# Patient Record
Sex: FEMALE | Race: WHITE | NOT HISPANIC OR LATINO
[De-identification: names, ages, dates, MRNs, and addresses within clinical notes are randomized per-mention and may not be internally consistent; named-entity substitution may affect disease eponyms.]

---

## 2019-02-05 ENCOUNTER — APPOINTMENT (OUTPATIENT)
Dept: OBGYN | Facility: CLINIC | Age: 59
End: 2019-02-05
Payer: COMMERCIAL

## 2019-02-05 VITALS
DIASTOLIC BLOOD PRESSURE: 80 MMHG | SYSTOLIC BLOOD PRESSURE: 120 MMHG | HEIGHT: 61 IN | BODY MASS INDEX: 24.92 KG/M2 | WEIGHT: 132 LBS

## 2019-02-05 DIAGNOSIS — N95.1 MENOPAUSAL AND FEMALE CLIMACTERIC STATES: ICD-10-CM

## 2019-02-05 DIAGNOSIS — Z82.49 FAMILY HISTORY OF ISCHEMIC HEART DISEASE AND OTHER DISEASES OF THE CIRCULATORY SYSTEM: ICD-10-CM

## 2019-02-05 DIAGNOSIS — Z82.3 FAMILY HISTORY OF STROKE: ICD-10-CM

## 2019-02-05 DIAGNOSIS — R39.15 URGENCY OF URINATION: ICD-10-CM

## 2019-02-05 DIAGNOSIS — Z00.00 ENCOUNTER FOR GENERAL ADULT MEDICAL EXAMINATION W/OUT ABNORMAL FINDINGS: ICD-10-CM

## 2019-02-05 DIAGNOSIS — Z80.3 FAMILY HISTORY OF MALIGNANT NEOPLASM OF BREAST: ICD-10-CM

## 2019-02-05 DIAGNOSIS — Z87.39 PERSONAL HISTORY OF OTHER DISEASES OF THE MUSCULOSKELETAL SYSTEM AND CONNECTIVE TISSUE: ICD-10-CM

## 2019-02-05 DIAGNOSIS — N63.0 UNSPECIFIED LUMP IN UNSPECIFIED BREAST: ICD-10-CM

## 2019-02-05 PROCEDURE — 99386 PREV VISIT NEW AGE 40-64: CPT

## 2019-02-05 PROCEDURE — 99203 OFFICE O/P NEW LOW 30 MIN: CPT | Mod: 25

## 2019-02-05 NOTE — PHYSICAL EXAM
[Awake] : awake [Alert] : alert [Acute Distress] : no acute distress [Mass] : breast mass [Nipple Discharge] : no nipple discharge [Axillary LAD] : no axillary lymphadenopathy [Soft] : soft [Tender] : non tender [Oriented x3] : oriented to person, place, and time [Normal] : uterus [No Bleeding] : there was no active vaginal bleeding [Uterine Adnexae] : were not tender and not enlarged [RRR, No Murmurs] : RRR, no murmurs [CTAB] : CTAB [de-identified] : tender 2 cm lump at 7 on right breast.

## 2019-02-11 LAB
CYTOLOGY CVX/VAG DOC THIN PREP: NORMAL
HPV HIGH+LOW RISK DNA PNL CVX: NOT DETECTED

## 2021-02-16 ENCOUNTER — APPOINTMENT (OUTPATIENT)
Dept: INTERNAL MEDICINE | Facility: CLINIC | Age: 61
End: 2021-02-16
Payer: COMMERCIAL

## 2021-02-16 DIAGNOSIS — U07.1 COVID-19: ICD-10-CM

## 2021-02-16 DIAGNOSIS — Z78.9 OTHER SPECIFIED HEALTH STATUS: ICD-10-CM

## 2021-02-16 PROCEDURE — 99203 OFFICE O/P NEW LOW 30 MIN: CPT | Mod: 95

## 2021-02-16 RX ORDER — MULTIVITAMIN
TABLET ORAL
Refills: 0 | Status: ACTIVE | COMMUNITY

## 2021-02-16 RX ORDER — PNV NO.95/FERROUS FUM/FOLIC AC 28MG-0.8MG
TABLET ORAL
Refills: 0 | Status: ACTIVE | COMMUNITY

## 2021-02-16 RX ORDER — ASCORBIC ACID 500 MG
TABLET ORAL
Refills: 0 | Status: ACTIVE | COMMUNITY

## 2021-02-16 NOTE — PLAN
[FreeTextEntry1] : She has recovered from COVID 19 but still has mild headache and fatigue. She is out of isolation.\par Attempted to reassure patient that he symptoms will resolve with time\par When she feels better she should have the 2nd dose of the shingles vaccine. It should be  from the COVID vaccine by 2 weeks but as she is not currently eligible there should not be an issue with timing\par She will schedule a physical

## 2021-02-16 NOTE — HISTORY OF PRESENT ILLNESS
[FreeTextEntry8] : 61 y/o female with no significant PMH who presents after COVID infection. She states she was diagnosed with COVID on 1/23/21.  She started to feel sick a few days before.  She had sneezing, coughing.  On the 23rd she developed loss of smell and taste.  She had fevers up to 100.  She did have congestion and some tightness in her chest.  She had fatigue.  She isolated for 2 weeks.  She went back to work last week but has been working only half days because of tiredness.  She works as an  at an [employer] company.  Her smell and taste are back although they appear off.  She still is having mild headaches, sinus problems and tiredness.  No fevers.\par She had shingles vaccine in November.  She was supposed to go back for the second dose in January but she did not because of of the Covid infection.  She wonders if she can have the second dose and when she should have it in relation to the Covid vaccine.  She is not currently eligible for the vaccine.

## 2021-06-01 ENCOUNTER — OUTPATIENT (OUTPATIENT)
Dept: OUTPATIENT SERVICES | Facility: HOSPITAL | Age: 61
LOS: 1 days | End: 2021-06-01
Payer: COMMERCIAL

## 2021-06-01 ENCOUNTER — RESULT REVIEW (OUTPATIENT)
Age: 61
End: 2021-06-01

## 2021-06-01 DIAGNOSIS — C50.919 MALIGNANT NEOPLASM OF UNSPECIFIED SITE OF UNSPECIFIED FEMALE BREAST: ICD-10-CM

## 2021-06-01 PROCEDURE — 88321 CONSLTJ&REPRT SLD PREP ELSWR: CPT

## 2021-06-02 DIAGNOSIS — C50.919 MALIGNANT NEOPLASM OF UNSPECIFIED SITE OF UNSPECIFIED FEMALE BREAST: ICD-10-CM

## 2021-06-11 ENCOUNTER — OUTPATIENT (OUTPATIENT)
Dept: OUTPATIENT SERVICES | Facility: HOSPITAL | Age: 61
LOS: 1 days | End: 2021-06-11
Payer: COMMERCIAL

## 2021-06-11 VITALS — HEIGHT: 60 IN | WEIGHT: 132.06 LBS

## 2021-06-11 DIAGNOSIS — Z90.89 ACQUIRED ABSENCE OF OTHER ORGANS: Chronic | ICD-10-CM

## 2021-06-11 DIAGNOSIS — C50.919 MALIGNANT NEOPLASM OF UNSPECIFIED SITE OF UNSPECIFIED FEMALE BREAST: ICD-10-CM

## 2021-06-11 DIAGNOSIS — Z01.818 ENCOUNTER FOR OTHER PREPROCEDURAL EXAMINATION: ICD-10-CM

## 2021-06-11 DIAGNOSIS — Z98.890 OTHER SPECIFIED POSTPROCEDURAL STATES: Chronic | ICD-10-CM

## 2021-06-11 LAB
ANION GAP SERPL CALC-SCNC: 2 MMOL/L — LOW (ref 5–17)
BASOPHILS # BLD AUTO: 0.05 K/UL — SIGNIFICANT CHANGE UP (ref 0–0.2)
BASOPHILS NFR BLD AUTO: 0.8 % — SIGNIFICANT CHANGE UP (ref 0–2)
BUN SERPL-MCNC: 13 MG/DL — SIGNIFICANT CHANGE UP (ref 7–23)
CALCIUM SERPL-MCNC: 9.1 MG/DL — SIGNIFICANT CHANGE UP (ref 8.5–10.1)
CHLORIDE SERPL-SCNC: 106 MMOL/L — SIGNIFICANT CHANGE UP (ref 96–108)
CO2 SERPL-SCNC: 29 MMOL/L — SIGNIFICANT CHANGE UP (ref 22–31)
CREAT SERPL-MCNC: 0.93 MG/DL — SIGNIFICANT CHANGE UP (ref 0.5–1.3)
EOSINOPHIL # BLD AUTO: 0.15 K/UL — SIGNIFICANT CHANGE UP (ref 0–0.5)
EOSINOPHIL NFR BLD AUTO: 2.5 % — SIGNIFICANT CHANGE UP (ref 0–6)
GLUCOSE SERPL-MCNC: 106 MG/DL — HIGH (ref 70–99)
HCT VFR BLD CALC: 41.7 % — SIGNIFICANT CHANGE UP (ref 34.5–45)
HGB BLD-MCNC: 13.9 G/DL — SIGNIFICANT CHANGE UP (ref 11.5–15.5)
IMM GRANULOCYTES NFR BLD AUTO: 0.3 % — SIGNIFICANT CHANGE UP (ref 0–1.5)
LYMPHOCYTES # BLD AUTO: 1.25 K/UL — SIGNIFICANT CHANGE UP (ref 1–3.3)
LYMPHOCYTES # BLD AUTO: 20.6 % — SIGNIFICANT CHANGE UP (ref 13–44)
MCHC RBC-ENTMCNC: 29.6 PG — SIGNIFICANT CHANGE UP (ref 27–34)
MCHC RBC-ENTMCNC: 33.3 GM/DL — SIGNIFICANT CHANGE UP (ref 32–36)
MCV RBC AUTO: 88.9 FL — SIGNIFICANT CHANGE UP (ref 80–100)
MONOCYTES # BLD AUTO: 0.66 K/UL — SIGNIFICANT CHANGE UP (ref 0–0.9)
MONOCYTES NFR BLD AUTO: 10.9 % — SIGNIFICANT CHANGE UP (ref 2–14)
NEUTROPHILS # BLD AUTO: 3.95 K/UL — SIGNIFICANT CHANGE UP (ref 1.8–7.4)
NEUTROPHILS NFR BLD AUTO: 64.9 % — SIGNIFICANT CHANGE UP (ref 43–77)
PLATELET # BLD AUTO: 263 K/UL — SIGNIFICANT CHANGE UP (ref 150–400)
POTASSIUM SERPL-MCNC: 5.2 MMOL/L — SIGNIFICANT CHANGE UP (ref 3.5–5.3)
POTASSIUM SERPL-SCNC: 5.2 MMOL/L — SIGNIFICANT CHANGE UP (ref 3.5–5.3)
RBC # BLD: 4.69 M/UL — SIGNIFICANT CHANGE UP (ref 3.8–5.2)
RBC # FLD: 13.2 % — SIGNIFICANT CHANGE UP (ref 10.3–14.5)
SODIUM SERPL-SCNC: 137 MMOL/L — SIGNIFICANT CHANGE UP (ref 135–145)
WBC # BLD: 6.08 K/UL — SIGNIFICANT CHANGE UP (ref 3.8–10.5)
WBC # FLD AUTO: 6.08 K/UL — SIGNIFICANT CHANGE UP (ref 3.8–10.5)

## 2021-06-11 PROCEDURE — 80048 BASIC METABOLIC PNL TOTAL CA: CPT

## 2021-06-11 PROCEDURE — 36415 COLL VENOUS BLD VENIPUNCTURE: CPT

## 2021-06-11 PROCEDURE — 93010 ELECTROCARDIOGRAM REPORT: CPT

## 2021-06-11 PROCEDURE — 93005 ELECTROCARDIOGRAM TRACING: CPT

## 2021-06-11 PROCEDURE — 85025 COMPLETE CBC W/AUTO DIFF WBC: CPT

## 2021-06-11 NOTE — H&P PST ADULT - NSANTHOSAYNRD_GEN_A_CORE
never tested/No. MADIE screening performed.  STOP BANG Legend: 0-2 = LOW Risk; 3-4 = INTERMEDIATE Risk; 5-8 = HIGH Risk

## 2021-06-11 NOTE — H&P PST ADULT - NSICDXPASTMEDICALHX_GEN_ALL_CORE_FT
PAST MEDICAL HISTORY:  Breast cancer right breast    H/O bronchiectasis albuterol prn    H/O inguinal hernia left    H/O osteoporosis

## 2021-06-11 NOTE — H&P PST ADULT - HISTORY OF PRESENT ILLNESS
60 yrs old female with h/o COVID in Jan 2021, and went to pcp after, who felt a mass in her right breast. Pt had mammo , sono and biopsy fo the right breast that showed malignancy. Pt present for Mary Jo  and right breast lumpectomy and sentinel lymph node biopsy.

## 2021-06-11 NOTE — H&P PST ADULT - NEGATIVE ENMT SYMPTOMS
no hearing difficulty/no ear pain/no tinnitus/no nasal congestion/no nasal discharge/no nasal obstruction/no nose bleeds

## 2021-06-11 NOTE — H&P PST ADULT - ASSESSMENT
60 yrs old female with h/o COVID in Jan 2021, and went to pcp after, who felt a mass in her right breast. Pt had mammo , sono and biopsy fo the right breast that showed malignancy. Pt present for Mary Jo  and right breast lumpectomy and sentinel lymph node biopsy.    Plan:  - PST instructions given ; NPO status instructions to be given by ASU .  - Pt instructed to take following meds with sip of water :   - Pt instructed to take routine evening medications unless indicated .  -  Stop NSAIDS ( Aspirin Alev Motrin Mobic Diclofenac), herbal supplements , MVI , Vitamin fish oil 7 days prior to surgery  unless   directed by surgeon or cardiologist;   - Medical Optimization  with Dr. Engel  - EZ wash instructions given & mupirocin instructions given.  - Labs EKG  as per surgeon request.   -  Pt instructed to self quarantine .  - Covid Testing scheduled on 6/18/21.  Pt notified and aware.  - Pt denies covid symptoms shortness of breath fever cough .

## 2021-06-12 DIAGNOSIS — C50.919 MALIGNANT NEOPLASM OF UNSPECIFIED SITE OF UNSPECIFIED FEMALE BREAST: ICD-10-CM

## 2021-06-12 DIAGNOSIS — Z01.818 ENCOUNTER FOR OTHER PREPROCEDURAL EXAMINATION: ICD-10-CM

## 2021-06-18 ENCOUNTER — APPOINTMENT (OUTPATIENT)
Dept: DISASTER EMERGENCY | Facility: CLINIC | Age: 61
End: 2021-06-18

## 2021-06-18 RX ORDER — HYDROMORPHONE HYDROCHLORIDE 2 MG/ML
0.5 INJECTION INTRAMUSCULAR; INTRAVENOUS; SUBCUTANEOUS
Refills: 0 | Status: DISCONTINUED | OUTPATIENT
Start: 2021-06-21 | End: 2021-06-21

## 2021-06-18 RX ORDER — SODIUM CHLORIDE 9 MG/ML
1000 INJECTION INTRAMUSCULAR; INTRAVENOUS; SUBCUTANEOUS
Refills: 0 | Status: DISCONTINUED | OUTPATIENT
Start: 2021-06-21 | End: 2021-06-21

## 2021-06-18 RX ORDER — MEPERIDINE HYDROCHLORIDE 50 MG/ML
12.5 INJECTION INTRAMUSCULAR; INTRAVENOUS; SUBCUTANEOUS
Refills: 0 | Status: DISCONTINUED | OUTPATIENT
Start: 2021-06-21 | End: 2021-06-21

## 2021-06-18 RX ORDER — SODIUM CHLORIDE 9 MG/ML
3 INJECTION INTRAMUSCULAR; INTRAVENOUS; SUBCUTANEOUS EVERY 8 HOURS
Refills: 0 | Status: DISCONTINUED | OUTPATIENT
Start: 2021-06-21 | End: 2021-06-21

## 2021-06-18 RX ORDER — OXYCODONE HYDROCHLORIDE 5 MG/1
5 TABLET ORAL ONCE
Refills: 0 | Status: DISCONTINUED | OUTPATIENT
Start: 2021-06-21 | End: 2021-06-21

## 2021-06-18 RX ORDER — ONDANSETRON 8 MG/1
4 TABLET, FILM COATED ORAL ONCE
Refills: 0 | Status: DISCONTINUED | OUTPATIENT
Start: 2021-06-21 | End: 2021-06-21

## 2021-06-19 LAB — SARS-COV-2 N GENE NPH QL NAA+PROBE: NOT DETECTED

## 2021-06-21 ENCOUNTER — RESULT REVIEW (OUTPATIENT)
Age: 61
End: 2021-06-21

## 2021-06-21 ENCOUNTER — OUTPATIENT (OUTPATIENT)
Dept: INPATIENT UNIT | Facility: HOSPITAL | Age: 61
LOS: 1 days | Discharge: HOME | End: 2021-06-21
Payer: COMMERCIAL

## 2021-06-21 ENCOUNTER — NON-APPOINTMENT (OUTPATIENT)
Age: 61
End: 2021-06-21

## 2021-06-21 VITALS
HEART RATE: 80 BPM | TEMPERATURE: 98 F | SYSTOLIC BLOOD PRESSURE: 112 MMHG | RESPIRATION RATE: 14 BRPM | OXYGEN SATURATION: 99 % | DIASTOLIC BLOOD PRESSURE: 72 MMHG

## 2021-06-21 VITALS
RESPIRATION RATE: 14 BRPM | SYSTOLIC BLOOD PRESSURE: 134 MMHG | WEIGHT: 134.48 LBS | TEMPERATURE: 98 F | DIASTOLIC BLOOD PRESSURE: 82 MMHG | OXYGEN SATURATION: 100 % | HEIGHT: 61 IN | HEART RATE: 91 BPM

## 2021-06-21 DIAGNOSIS — Z98.890 OTHER SPECIFIED POSTPROCEDURAL STATES: Chronic | ICD-10-CM

## 2021-06-21 DIAGNOSIS — C50.919 MALIGNANT NEOPLASM OF UNSPECIFIED SITE OF UNSPECIFIED FEMALE BREAST: ICD-10-CM

## 2021-06-21 DIAGNOSIS — N64.89 OTHER SPECIFIED DISORDERS OF BREAST: ICD-10-CM

## 2021-06-21 DIAGNOSIS — Z90.89 ACQUIRED ABSENCE OF OTHER ORGANS: Chronic | ICD-10-CM

## 2021-06-21 DIAGNOSIS — N65.1 DISPROPORTION OF RECONSTRUCTED BREAST: ICD-10-CM

## 2021-06-21 PROCEDURE — 99261: CPT

## 2021-06-21 PROCEDURE — 88341 IMHCHEM/IMCYTCHM EA ADD ANTB: CPT | Mod: 26,59

## 2021-06-21 PROCEDURE — 19307 MAST MOD RAD: CPT | Mod: AS

## 2021-06-21 PROCEDURE — 88360 TUMOR IMMUNOHISTOCHEM/MANUAL: CPT | Mod: 26,59

## 2021-06-21 PROCEDURE — 88305 TISSUE EXAM BY PATHOLOGIST: CPT | Mod: 26

## 2021-06-21 PROCEDURE — 88342 IMHCHEM/IMCYTCHM 1ST ANTB: CPT | Mod: 26,59

## 2021-06-21 PROCEDURE — 88307 TISSUE EXAM BY PATHOLOGIST: CPT | Mod: 26

## 2021-06-21 PROCEDURE — 88360 TUMOR IMMUNOHISTOCHEM/MANUAL: CPT

## 2021-06-21 PROCEDURE — 88304 TISSUE EXAM BY PATHOLOGIST: CPT

## 2021-06-21 PROCEDURE — C1789: CPT

## 2021-06-21 PROCEDURE — 88341 IMHCHEM/IMCYTCHM EA ADD ANTB: CPT

## 2021-06-21 PROCEDURE — 88305 TISSUE EXAM BY PATHOLOGIST: CPT

## 2021-06-21 PROCEDURE — 88304 TISSUE EXAM BY PATHOLOGIST: CPT | Mod: 26

## 2021-06-21 PROCEDURE — 88307 TISSUE EXAM BY PATHOLOGIST: CPT

## 2021-06-21 PROCEDURE — 88342 IMHCHEM/IMCYTCHM 1ST ANTB: CPT

## 2021-06-21 RX ORDER — BUPIVACAINE 13.3 MG/ML
20 INJECTION, SUSPENSION, LIPOSOMAL INFILTRATION ONCE
Refills: 0 | Status: DISCONTINUED | OUTPATIENT
Start: 2021-06-21 | End: 2021-06-21

## 2021-06-21 RX ORDER — ACETAMINOPHEN 500 MG
975 TABLET ORAL ONCE
Refills: 0 | Status: COMPLETED | OUTPATIENT
Start: 2021-06-21 | End: 2021-06-21

## 2021-06-21 RX ORDER — MULTIVIT-MIN/FERROUS GLUCONATE 9 MG/15 ML
0 LIQUID (ML) ORAL
Qty: 0 | Refills: 0 | DISCHARGE

## 2021-06-21 RX ORDER — CELECOXIB 200 MG/1
200 CAPSULE ORAL ONCE
Refills: 0 | Status: COMPLETED | OUTPATIENT
Start: 2021-06-21 | End: 2021-06-21

## 2021-06-21 RX ORDER — FAMOTIDINE 10 MG/ML
20 INJECTION INTRAVENOUS ONCE
Refills: 0 | Status: COMPLETED | OUTPATIENT
Start: 2021-06-21 | End: 2021-06-21

## 2021-06-21 RX ORDER — SODIUM CHLORIDE 9 MG/ML
1000 INJECTION, SOLUTION INTRAVENOUS
Refills: 0 | Status: DISCONTINUED | OUTPATIENT
Start: 2021-06-21 | End: 2021-06-21

## 2021-06-21 RX ORDER — LETROZOLE 2.5 MG/1
1 TABLET, FILM COATED ORAL
Qty: 0 | Refills: 0 | DISCHARGE

## 2021-06-21 RX ORDER — OXYCODONE HYDROCHLORIDE 5 MG/1
10 TABLET ORAL ONCE
Refills: 0 | Status: DISCONTINUED | OUTPATIENT
Start: 2021-06-21 | End: 2021-06-21

## 2021-06-21 RX ORDER — CHOLECALCIFEROL (VITAMIN D3) 125 MCG
0 CAPSULE ORAL
Qty: 0 | Refills: 0 | DISCHARGE

## 2021-06-21 RX ORDER — PREGABALIN 225 MG/1
1 CAPSULE ORAL
Qty: 0 | Refills: 0 | DISCHARGE

## 2021-06-21 RX ORDER — ALBUTEROL 90 UG/1
0 AEROSOL, METERED ORAL
Qty: 0 | Refills: 0 | DISCHARGE

## 2021-06-21 RX ADMIN — Medication 975 MILLIGRAM(S): at 07:57

## 2021-06-21 RX ADMIN — OXYCODONE HYDROCHLORIDE 10 MILLIGRAM(S): 5 TABLET ORAL at 07:58

## 2021-06-21 RX ADMIN — HYDROMORPHONE HYDROCHLORIDE 0.5 MILLIGRAM(S): 2 INJECTION INTRAMUSCULAR; INTRAVENOUS; SUBCUTANEOUS at 13:25

## 2021-06-21 RX ADMIN — OXYCODONE HYDROCHLORIDE 10 MILLIGRAM(S): 5 TABLET ORAL at 07:57

## 2021-06-21 RX ADMIN — CELECOXIB 200 MILLIGRAM(S): 200 CAPSULE ORAL at 07:57

## 2021-06-21 RX ADMIN — OXYCODONE HYDROCHLORIDE 5 MILLIGRAM(S): 5 TABLET ORAL at 15:01

## 2021-06-21 RX ADMIN — CELECOXIB 200 MILLIGRAM(S): 200 CAPSULE ORAL at 07:58

## 2021-06-21 RX ADMIN — FAMOTIDINE 20 MILLIGRAM(S): 10 INJECTION INTRAVENOUS at 07:57

## 2021-06-21 RX ADMIN — HYDROMORPHONE HYDROCHLORIDE 0.5 MILLIGRAM(S): 2 INJECTION INTRAMUSCULAR; INTRAVENOUS; SUBCUTANEOUS at 13:44

## 2021-06-21 NOTE — ASU DISCHARGE PLAN (ADULT/PEDIATRIC) - ASU DC SPECIAL INSTRUCTIONSFT
follow up with Dr. Dang as scheduled  take antibiotics and pain meds as instructed  no shower; sponge bath only  empty and record Domingo outputs q 12hr  leave all dressings in place   may

## 2021-06-21 NOTE — ASU PATIENT PROFILE, ADULT - PMH
Breast cancer  right breast  H/O bronchiectasis  albuterol prn  H/O inguinal hernia  left  H/O osteoporosis

## 2021-06-21 NOTE — ASU PREOP CHECKLIST - STERILIZATION AFFIRMATION
-- DO NOT REPLY / DO NOT REPLY ALL --  -- Message is from the [facility]--    COVID-19 Universal Screening: Negative    General Patient Message      Reason for Call: patient daughter would like a call back from the nurse due to a fall, and patient is at [facility] and is dizzy, and she has macroadenoma    Caller Information       Type Contact Phone    01/06/2021 11:19 AM CST Phone (Incoming) Wendy Yuan (Emergency Contact) [phone]     daughter          Alternative phone number: [phone]  Turnaround time given to caller:   \"This message will be sent to [state Provider's name]. The clinical team will fulfill your request as soon as they review your message.\"     n/a

## 2021-06-28 DIAGNOSIS — C50.911 MALIGNANT NEOPLASM OF UNSPECIFIED SITE OF RIGHT FEMALE BREAST: ICD-10-CM

## 2021-06-28 DIAGNOSIS — M81.0 AGE-RELATED OSTEOPOROSIS WITHOUT CURRENT PATHOLOGICAL FRACTURE: ICD-10-CM

## 2021-07-09 PROBLEM — Z87.39 PERSONAL HISTORY OF OTHER DISEASES OF THE MUSCULOSKELETAL SYSTEM AND CONNECTIVE TISSUE: Chronic | Status: ACTIVE | Noted: 2021-06-11

## 2021-07-09 PROBLEM — Z87.09 PERSONAL HISTORY OF OTHER DISEASES OF THE RESPIRATORY SYSTEM: Chronic | Status: ACTIVE | Noted: 2021-06-11

## 2021-07-09 PROBLEM — Z87.19 PERSONAL HISTORY OF OTHER DISEASES OF THE DIGESTIVE SYSTEM: Chronic | Status: ACTIVE | Noted: 2021-06-11

## 2021-07-09 PROBLEM — C50.919 MALIGNANT NEOPLASM OF UNSPECIFIED SITE OF UNSPECIFIED FEMALE BREAST: Chronic | Status: ACTIVE | Noted: 2021-06-11

## 2021-07-29 ENCOUNTER — APPOINTMENT (OUTPATIENT)
Dept: RADIATION ONCOLOGY | Facility: CLINIC | Age: 61
End: 2021-07-29
Payer: COMMERCIAL

## 2021-07-29 VITALS
HEIGHT: 60 IN | DIASTOLIC BLOOD PRESSURE: 70 MMHG | BODY MASS INDEX: 26.5 KG/M2 | WEIGHT: 135 LBS | RESPIRATION RATE: 16 BRPM | HEART RATE: 62 BPM | SYSTOLIC BLOOD PRESSURE: 122 MMHG

## 2021-07-29 DIAGNOSIS — M19.91 PRIMARY OSTEOARTHRITIS, UNSPECIFIED SITE: ICD-10-CM

## 2021-07-29 DIAGNOSIS — Z80.42 FAMILY HISTORY OF MALIGNANT NEOPLASM OF PROSTATE: ICD-10-CM

## 2021-07-29 DIAGNOSIS — Z23 ENCOUNTER FOR IMMUNIZATION: ICD-10-CM

## 2021-07-29 DIAGNOSIS — Z80.0 FAMILY HISTORY OF MALIGNANT NEOPLASM OF DIGESTIVE ORGANS: ICD-10-CM

## 2021-07-29 PROCEDURE — 99204 OFFICE O/P NEW MOD 45 MIN: CPT | Mod: 25

## 2021-07-29 PROCEDURE — 99072 ADDL SUPL MATRL&STAF TM PHE: CPT

## 2021-07-29 RX ORDER — CHLORHEXIDINE GLUCONATE 4 %
5 LIQUID (ML) TOPICAL
Refills: 0 | Status: ACTIVE | COMMUNITY

## 2021-07-29 RX ORDER — ZINC SULFATE 50(220)MG
CAPSULE ORAL
Refills: 0 | Status: DISCONTINUED | COMMUNITY
End: 2021-07-29

## 2021-07-29 RX ORDER — LETROZOLE TABLETS 2.5 MG/1
2.5 TABLET, FILM COATED ORAL
Refills: 0 | Status: ACTIVE | COMMUNITY

## 2021-07-29 RX ORDER — DENOSUMAB 60 MG/ML
60 INJECTION SUBCUTANEOUS
Refills: 0 | Status: ACTIVE | COMMUNITY

## 2021-07-29 NOTE — PHYSICAL EXAM
[Breast Palpation Mass] : no palpable masses [No UE Edema] : there is no upper extremity edema [Normal] : oriented to person, place and time, the affect was normal, the mood was normal and not anxious [de-identified] : right expander intact with cording righ axilla and upper arm. Left breast scars healing well.

## 2021-07-29 NOTE — HISTORY OF PRESENT ILLNESS
[FreeTextEntry1] : The patient is a pleasant 60 year old female diagnosed with right breast cancer. She has family Hx of metastatic breast cancer in her 43 year old sister ( at age 29), tested negative for BRCA mutation 15 years ago. The patient had [facility] genetic panel testing which was negative (VUS in APC, BMPR1A, RAD51D). She was recovering from a mild case of COVID in 2021 and also noted right breast fullness (present about 1 year ago but did not bring it to any medical attention). She had not had any recent imaging. She was seen by her primary care doctor in 2021 and breast imaging was recommended due to an abnormal right breast and axilla exam. She was referred to Dr. Avila and had a confirmation of palpable right breast mass and axillary lymphadenopathy. Mammogram/ tomosynthesis revealed 2.1 cm enlarged right axillary node with cortical thickening and a 2.3 cm spiculated mass in the right breast at 6:00, 5cm FN. An MRI was recommended but not performed. Biopsy was performed on 3/24//21 of both breast and right axillary node and both were positive for Infiltrating ductal carcinoma, moderately differentiated, DCIS, solid and cribriform types, intermediate to high grade. ER %, CA negative, HER2 negative. Ki-67 35%. As part of preop workup CT chest, abdomen and pelvis was done revealing nonspecific subcentimeter pulmonary nodules and urinary bladder wall enhancing lesion.She was referred to Pulmonary/Urology (Dr. Hale) for clearance and there was no pathology confirmed She was also seen by gyn (Dr. Naik) who diagnosed her with fibroids rather than bladder wall thickening. She was started on Letrozole in anticipation of delay. After 2 months of Letrozole she noted a decrease in size of the lesion. Right mastectomy was performed with expander (Dr. Dang) on 21 and final pathology revealed  Invasive Ductal Carcinoma, tumor greatest dimension 3.3 x 2.1 x 1.5 cm. DCIS present. Margins uninvolved by IDC with closest distance 0.6 cm anterior. DCIS not found in margins, greater then 1cm. + LVI.  2/7 regional lymph nodes positive, both macrometastases, >2mm. The largest metastatic deposit 1.4 cm. Extranodal extension present. Biomarkers ER positive, CA negative, HER2 negative by FISH. Ki-67 35%. Oncotype DX is 27 and she is meeting with Dr. Crooks next week to review. She presents today to discuss the role of radiation therapy in her care.

## 2021-07-29 NOTE — REVIEW OF SYSTEMS
[Fatigue] : fatigue [Joint Pain] : joint pain [Muscle Pain] : muscle pain [Insomnia] : insomnia [Anxiety] : anxiety [Negative] : Allergic/Immunologic

## 2021-07-29 NOTE — VITALS
[Maximal Pain Intensity: 0/10] : 0/10 [NoTreatment Scheduled] : no treatment scheduled [90: Able to carry normal activity; minor signs or symptoms of disease.] : 90: Able to carry normal activity; minor signs or symptoms of disease.  [Date: ____________] : Patient's last distress assessment performed on [unfilled]. [5 - Distress Level] : Distress Level: 5

## 2021-07-30 ENCOUNTER — TRANSCRIPTION ENCOUNTER (OUTPATIENT)
Age: 61
End: 2021-07-30

## 2021-08-11 NOTE — ASU PATIENT PROFILE, ADULT - TOBACCO USE
[facility]  [address] [place] [address]  [phone]      Pregnancy Statement    1/9/2019      RE: Kristie Vasques, 1988  [address] [facility] [address]        To whom it may concern;    This is to certify that Kristie is pregnant and is being followed by our staff. Kristie's estimated due date is 6/29/2019.    Sincerely,    Alysia Salguero CNM/Peri Mustafa RN     Former smoker

## 2021-08-11 NOTE — ASU PATIENT PROFILE, ADULT - NSICDXPASTSURGICALHX_GEN_ALL_CORE_FT
PAST SURGICAL HISTORY:  H/O breast biopsy x2    H/O inguinal hernia repair left    History of tonsillectomy

## 2021-08-11 NOTE — ASU PATIENT PROFILE, ADULT - NSICDXPASTMEDICALHX_GEN_ALL_CORE_FT
PAST MEDICAL HISTORY:  Breast cancer right breast    H/O arthritis     H/O bronchiectasis albuterol prn    H/O inguinal hernia left    H/O osteoporosis     Hyperlipidemia

## 2021-08-12 ENCOUNTER — OUTPATIENT (OUTPATIENT)
Dept: INPATIENT UNIT | Facility: HOSPITAL | Age: 61
LOS: 1 days | Discharge: HOME | End: 2021-08-12
Payer: COMMERCIAL

## 2021-08-12 VITALS
SYSTOLIC BLOOD PRESSURE: 103 MMHG | RESPIRATION RATE: 14 BRPM | DIASTOLIC BLOOD PRESSURE: 63 MMHG | OXYGEN SATURATION: 96 % | TEMPERATURE: 98 F | HEART RATE: 99 BPM

## 2021-08-12 VITALS
RESPIRATION RATE: 16 BRPM | TEMPERATURE: 98 F | SYSTOLIC BLOOD PRESSURE: 123 MMHG | HEART RATE: 76 BPM | OXYGEN SATURATION: 98 % | WEIGHT: 134.92 LBS | DIASTOLIC BLOOD PRESSURE: 74 MMHG

## 2021-08-12 DIAGNOSIS — C50.911 MALIGNANT NEOPLASM OF UNSPECIFIED SITE OF RIGHT FEMALE BREAST: ICD-10-CM

## 2021-08-12 DIAGNOSIS — Z88.0 ALLERGY STATUS TO PENICILLIN: ICD-10-CM

## 2021-08-12 DIAGNOSIS — Z79.810 LONG TERM (CURRENT) USE OF SELECTIVE ESTROGEN RECEPTOR MODULATORS (SERMS): ICD-10-CM

## 2021-08-12 DIAGNOSIS — Z90.89 ACQUIRED ABSENCE OF OTHER ORGANS: Chronic | ICD-10-CM

## 2021-08-12 DIAGNOSIS — M19.90 UNSPECIFIED OSTEOARTHRITIS, UNSPECIFIED SITE: ICD-10-CM

## 2021-08-12 DIAGNOSIS — Z79.811 LONG TERM (CURRENT) USE OF AROMATASE INHIBITORS: ICD-10-CM

## 2021-08-12 DIAGNOSIS — Z98.890 OTHER SPECIFIED POSTPROCEDURAL STATES: Chronic | ICD-10-CM

## 2021-08-12 DIAGNOSIS — C50.511 MALIGNANT NEOPLASM OF LOWER-OUTER QUADRANT OF RIGHT FEMALE BREAST: ICD-10-CM

## 2021-08-12 DIAGNOSIS — Z87.891 PERSONAL HISTORY OF NICOTINE DEPENDENCE: ICD-10-CM

## 2021-08-12 DIAGNOSIS — E78.5 HYPERLIPIDEMIA, UNSPECIFIED: ICD-10-CM

## 2021-08-12 DIAGNOSIS — E03.9 HYPOTHYROIDISM, UNSPECIFIED: ICD-10-CM

## 2021-08-12 LAB
ANION GAP SERPL CALC-SCNC: 2 MMOL/L — LOW (ref 5–17)
BUN SERPL-MCNC: 12 MG/DL — SIGNIFICANT CHANGE UP (ref 7–23)
CALCIUM SERPL-MCNC: 8.7 MG/DL — SIGNIFICANT CHANGE UP (ref 8.5–10.1)
CHLORIDE SERPL-SCNC: 108 MMOL/L — SIGNIFICANT CHANGE UP (ref 96–108)
CO2 SERPL-SCNC: 28 MMOL/L — SIGNIFICANT CHANGE UP (ref 22–31)
CREAT SERPL-MCNC: 0.82 MG/DL — SIGNIFICANT CHANGE UP (ref 0.5–1.3)
GLUCOSE SERPL-MCNC: 94 MG/DL — SIGNIFICANT CHANGE UP (ref 70–99)
HCT VFR BLD CALC: 39.9 % — SIGNIFICANT CHANGE UP (ref 34.5–45)
HGB BLD-MCNC: 13.3 G/DL — SIGNIFICANT CHANGE UP (ref 11.5–15.5)
INR BLD: 1.06 RATIO — SIGNIFICANT CHANGE UP (ref 0.88–1.16)
MCHC RBC-ENTMCNC: 30.2 PG — SIGNIFICANT CHANGE UP (ref 27–34)
MCHC RBC-ENTMCNC: 33.3 GM/DL — SIGNIFICANT CHANGE UP (ref 32–36)
MCV RBC AUTO: 90.5 FL — SIGNIFICANT CHANGE UP (ref 80–100)
PLATELET # BLD AUTO: 249 K/UL — SIGNIFICANT CHANGE UP (ref 150–400)
POTASSIUM SERPL-MCNC: 4.5 MMOL/L — SIGNIFICANT CHANGE UP (ref 3.5–5.3)
POTASSIUM SERPL-SCNC: 4.5 MMOL/L — SIGNIFICANT CHANGE UP (ref 3.5–5.3)
PROTHROM AB SERPL-ACNC: 12.4 SEC — SIGNIFICANT CHANGE UP (ref 10.6–13.6)
RBC # BLD: 4.41 M/UL — SIGNIFICANT CHANGE UP (ref 3.8–5.2)
RBC # FLD: 13.3 % — SIGNIFICANT CHANGE UP (ref 10.3–14.5)
SODIUM SERPL-SCNC: 138 MMOL/L — SIGNIFICANT CHANGE UP (ref 135–145)
WBC # BLD: 5.39 K/UL — SIGNIFICANT CHANGE UP (ref 3.8–10.5)
WBC # FLD AUTO: 5.39 K/UL — SIGNIFICANT CHANGE UP (ref 3.8–10.5)

## 2021-08-12 PROCEDURE — 80048 BASIC METABOLIC PNL TOTAL CA: CPT

## 2021-08-12 PROCEDURE — 36561 INSERT TUNNELED CV CATH: CPT

## 2021-08-12 PROCEDURE — C1894: CPT

## 2021-08-12 PROCEDURE — 85610 PROTHROMBIN TIME: CPT

## 2021-08-12 PROCEDURE — 93005 ELECTROCARDIOGRAM TRACING: CPT

## 2021-08-12 PROCEDURE — 85027 COMPLETE CBC AUTOMATED: CPT

## 2021-08-12 PROCEDURE — C1769: CPT

## 2021-08-12 PROCEDURE — C1887: CPT

## 2021-08-12 PROCEDURE — 77001 FLUOROGUIDE FOR VEIN DEVICE: CPT | Mod: 26

## 2021-08-12 PROCEDURE — C1788: CPT

## 2021-08-12 PROCEDURE — 36415 COLL VENOUS BLD VENIPUNCTURE: CPT

## 2021-08-12 PROCEDURE — 76937 US GUIDE VASCULAR ACCESS: CPT | Mod: 26

## 2021-08-12 PROCEDURE — 76937 US GUIDE VASCULAR ACCESS: CPT

## 2021-08-12 PROCEDURE — 77001 FLUOROGUIDE FOR VEIN DEVICE: CPT

## 2021-08-12 PROCEDURE — 93010 ELECTROCARDIOGRAM REPORT: CPT

## 2021-08-12 RX ORDER — CALCIUM CARBONATE 500(1250)
2 TABLET ORAL
Qty: 0 | Refills: 0 | DISCHARGE

## 2021-08-12 RX ORDER — ALBUTEROL 90 UG/1
0 AEROSOL, METERED ORAL
Qty: 0 | Refills: 0 | DISCHARGE

## 2021-08-12 RX ORDER — LETROZOLE 2.5 MG/1
1 TABLET, FILM COATED ORAL
Qty: 0 | Refills: 0 | DISCHARGE

## 2021-08-12 RX ORDER — MEPERIDINE HYDROCHLORIDE 50 MG/ML
12.5 INJECTION INTRAMUSCULAR; INTRAVENOUS; SUBCUTANEOUS
Refills: 0 | Status: DISCONTINUED | OUTPATIENT
Start: 2021-08-12 | End: 2021-08-12

## 2021-08-12 RX ORDER — OXYCODONE AND ACETAMINOPHEN 5; 325 MG/1; MG/1
1 TABLET ORAL
Qty: 0 | Refills: 0 | DISCHARGE

## 2021-08-12 RX ORDER — SODIUM CHLORIDE 9 MG/ML
1000 INJECTION INTRAMUSCULAR; INTRAVENOUS; SUBCUTANEOUS
Refills: 0 | Status: DISCONTINUED | OUTPATIENT
Start: 2021-08-12 | End: 2021-08-12

## 2021-08-12 RX ORDER — HYDROMORPHONE HYDROCHLORIDE 2 MG/ML
0.5 INJECTION INTRAMUSCULAR; INTRAVENOUS; SUBCUTANEOUS
Refills: 0 | Status: DISCONTINUED | OUTPATIENT
Start: 2021-08-12 | End: 2021-08-12

## 2021-08-12 RX ORDER — DENOSUMAB 60 MG/ML
0 INJECTION SUBCUTANEOUS
Qty: 0 | Refills: 0 | DISCHARGE

## 2021-08-12 RX ORDER — OXYCODONE HYDROCHLORIDE 5 MG/1
5 TABLET ORAL ONCE
Refills: 0 | Status: DISCONTINUED | OUTPATIENT
Start: 2021-08-12 | End: 2021-08-12

## 2021-08-12 RX ORDER — ONDANSETRON 8 MG/1
4 TABLET, FILM COATED ORAL ONCE
Refills: 0 | Status: DISCONTINUED | OUTPATIENT
Start: 2021-08-12 | End: 2021-08-12

## 2021-08-12 NOTE — ASU DISCHARGE PLAN (ADULT/PEDIATRIC) - BRAND OF COVID-19 VACCINATION
CHIEF COMPLAINT  Fall and Dizziness      HISTORY OF PRESENT ILLNESS    PROBLEM/CONDITION: He describes chronic (well over a year) orthostatic hypotension symptoms to the degree that it is putting him at significant risk for falling during the night. EXACERBATING FACTOR may have included tamsulosin, which was prescribed a couple of years ago due to postoperative urinary retention. He reports no history of prostate problems. His wife called a friend who is a doctor who suggested that he stop the tamsulosin, and they stopped the medication a couple of days ago and he has had no urinary problems since and perhaps the orthostatic symptoms are MILDLY improved. This may explain why his blood pressure is up today. I told him to remain OFF the tamsulosin unless he started to develop urinary retention symptoms. However, he has fallen twice within the last several months and most recently, a few nights ago, fell forward hitting the front of his head, without loss of consciousness, but his wife describes that his cognition and short-term memory and balance are impaired. She reports no urinary incontinence. We discussed differential diagnosis.    PROBLEM/CONDITION: Review of previous labs shows chronic kidney disease, stage III, along with MILD anemia, and he is due for follow-up labs to evaluate these problems.    No other complaints or concerns reported.    Problem List Items Addressed This Visit        Neuro    Head injury    Relevant Orders    MRI Brain Without Contrast       Cardiac/Vascular    Benign essential hypertension    Orthostatic hypotension - Primary (Chronic)    Relevant Orders    Ambulatory Referral to Physical/Occupational Therapy       Renal/    Chronic kidney disease, stage 3 (Chronic)    Relevant Orders    Renal function panel    CBC auto differential       Oncology    Anemia due to stage 3 chronic kidney disease    Relevant Orders    CBC auto differential       Other    Impairment of balance    Relevant  "Orders    MRI Brain Without Contrast    Ambulatory Referral to Physical/Occupational Therapy    Impaired functional mobility, balance, gait, and endurance    Relevant Orders    Ambulatory Referral to Physical/Occupational Therapy    Personal history of fall (Chronic)    Relevant Orders    Ambulatory Referral to Physical/Occupational Therapy      Other Visit Diagnoses     Head trauma, initial encounter        Relevant Orders    MRI Brain Without Contrast          PAST MEDICAL HISTORY, FAMILY HISTORY and SOCIAL HISTORY, CURRENT MEDICATION LIST, and ALLERGY LIST reviewed by me (MARITZA Padilla MD) and are updated consistent with the patient's report.    REVIEW OF SYSTEMS  CONSTITUTIONAL: No fever reported.  CARDIOVASCULAR: No chest pain reported.  PULMONARY: No trouble breathing reported.     PHYSICAL EXAM  Vitals:    09/04/18 1317   BP: (!) 150/60   BP Location: Right arm   Patient Position: Sitting   BP Method: Large (Manual)   Pulse: 77   Temp: 98.6 °F (37 °C)   TempSrc: Oral   SpO2: 96%   Weight: 83.4 kg (183 lb 13.8 oz)   Height: 5' 11" (1.803 m)   CONSTITUTIONAL: Vital signs noted. No apparent distress. Does not appear acutely ill or septic. Appears adequately hydrated.  HEENT: External ENT grossly unremarkable. Hearing grossly poor. Oropharynx moist.  PULM: Lungs clear. Breathing unlabored.  HEART: Auscultation reveals regular rate and rhythm.  DERM: Skin warm and moist with fair turgor.  NEURO: There are no gross focal motor deficits or gross deficits of cranial nerves III-XII.  PSYCHIATRIC: Alert and oriented x 3. Mood is grossly neutral. Affect appropriate. Judgment and insight not grossly compromised.  MUSCULOSKELETAL: Mobile with rolling walker.    ASSESSMENT and PLAN  Orthostatic hypotension  -     Ambulatory Referral to Physical/Occupational Therapy    Benign essential hypertension    Chronic kidney disease, stage 3  -     Renal function panel; Future; Expected date: 09/04/2018  -     CBC auto " "differential; Future; Expected date: 09/04/2018    Anemia due to stage 3 chronic kidney disease  -     CBC auto differential; Future; Expected date: 09/04/2018    Impairment of balance  -     MRI Brain Without Contrast; Future; Expected date: 09/04/2018  -     Ambulatory Referral to Physical/Occupational Therapy    Impaired functional mobility, balance, gait, and endurance  -     Ambulatory Referral to Physical/Occupational Therapy    Personal history of fall  -     Ambulatory Referral to Physical/Occupational Therapy    Injury of head, initial encounter  -     MRI Brain Without Contrast; Future; Expected date: 09/04/2018    Head trauma, initial encounter  -     MRI Brain Without Contrast; Future; Expected date: 09/04/2018        PRESCRIPTION MEDICATION MANAGEMENT  Except as noted below, CURRENT MEDICATIONS are to remain unchanged from that listed above.     There are no discontinued medications.    Follow-up in about 2 days (around 9/6/2018) for review test results and discuss treatment plan.    There are no Patient Instructions on file for this visit.    ABOUT THIS DOCUMENTATION:  · The order of the conditions listed in the HPI is one of convenience and does not necessarily reflect the chronology of the appointment, nor the relative importance of a condition.  · Documentation entered by me for this encounter was done in part using speech-recognition technology. Although I have made an effort to ensure accuracy, "sound like" errors may exist and should be interpreted in context.                        -MARITZA Padilla MD  " Moderna dose 1 and 2

## 2021-08-12 NOTE — ASU DISCHARGE PLAN (ADULT/PEDIATRIC) - FREQUENT HAND WASHING PREVENTS THE SPREAD OF INFECTION.
Per Grandma, patient has been having intermittent chest pain x 2 weeks with occasional difficulty breathing. C/O headache and vomiting for the past 2 days. Patient reports hx of heart murmur. Mother passed away 2 months ago.
Statement Selected

## 2022-01-14 PROBLEM — E78.5 HYPERLIPIDEMIA, UNSPECIFIED: Chronic | Status: ACTIVE | Noted: 2021-08-11

## 2022-01-14 PROBLEM — Z87.39 PERSONAL HISTORY OF OTHER DISEASES OF THE MUSCULOSKELETAL SYSTEM AND CONNECTIVE TISSUE: Chronic | Status: ACTIVE | Noted: 2021-08-11

## 2022-01-18 ENCOUNTER — APPOINTMENT (OUTPATIENT)
Dept: RADIATION ONCOLOGY | Facility: CLINIC | Age: 62
End: 2022-01-18
Payer: COMMERCIAL

## 2022-01-18 VITALS — RESPIRATION RATE: 16 BRPM | BODY MASS INDEX: 25.13 KG/M2 | WEIGHT: 128 LBS | HEIGHT: 60 IN | HEART RATE: 70 BPM

## 2022-01-18 PROCEDURE — 77332 RADIATION TREATMENT AID(S): CPT

## 2022-01-18 PROCEDURE — 77263 THER RADIOLOGY TX PLNG CPLX: CPT

## 2022-01-18 PROCEDURE — 99213 OFFICE O/P EST LOW 20 MIN: CPT

## 2022-01-18 PROCEDURE — 77290 THER RAD SIMULAJ FIELD CPLX: CPT

## 2022-01-18 RX ORDER — ROSUVASTATIN CALCIUM 5 MG/1
TABLET, FILM COATED ORAL
Refills: 0 | Status: ACTIVE | COMMUNITY

## 2022-01-18 NOTE — PHYSICAL EXAM
[No UE Edema] : there is no upper extremity edema [Normal] : oriented to person, place and time, the affect was normal, the mood was normal and not anxious [de-identified] : left cw port [de-identified] : right expander intact and left breast IMF scar well healed. no masses

## 2022-01-18 NOTE — HISTORY OF PRESENT ILLNESS
[FreeTextEntry1] : The patient is a pleasant 60 year old female diagnosed with right breast cancer. She has family Hx of metastatic breast cancer in her 43 year old sister ( at age 29), tested negative for BRCA mutation 15 years ago. The patient had [facility] genetic panel testing which was negative (VUS in APC, BMPR1A, RAD51D). She was recovering from a mild case of COVID in 2021 and also noted right breast fullness (present about 1 year ago but did not bring it to any medical attention). She had not had any recent imaging. She was seen by her primary care doctor in 2021 and breast imaging was recommended due to an abnormal right breast and axilla exam. She was referred to Dr. Avila and had a confirmation of palpable right breast mass and axillary lymphadenopathy. Mammogram/ tomosynthesis revealed 2.1 cm enlarged right axillary node with cortical thickening and a 2.3 cm spiculated mass in the right breast at 6:00, 5cm FN. An MRI was recommended but not performed. Biopsy was performed on 3/24//21 of both breast and right axillary node and both were positive for Infiltrating ductal carcinoma, moderately differentiated, DCIS, solid and cribriform types, intermediate to high grade. ER %, ME negative, HER2 negative. Ki-67 35%. As part of preop workup CT chest, abdomen and pelvis was done revealing nonspecific subcentimeter pulmonary nodules and urinary bladder wall enhancing lesion.She was referred to Pulmonary/Urology (Dr. Hale) for clearance and there was no pathology confirmed She was also seen by gyn (Dr. Naik) who diagnosed her with fibroids rather than bladder wall thickening. She was started on Letrozole in anticipation of delay. After 2 months of Letrozole she noted a decrease in size of the lesion. Right mastectomy was performed with expander (Dr. Dang) on 21 and final pathology revealed  Invasive Ductal Carcinoma, tumor greatest dimension 3.3 x 2.1 x 1.5 cm. DCIS present. Margins uninvolved by IDC with closest distance 0.6 cm anterior. DCIS not found in margins, greater then 1cm. + LVI.  2/7 regional lymph nodes positive, both macrometastases, >2mm. The largest metastatic deposit 1.4 cm. Extranodal extension present. Biomarkers ER positive, ME negative, HER2 negative by FISH. Ki-67 35%. Oncotype DX is 27 and she is meeting with Dr. Crooks next week to review. She presents today to discuss the role of radiation therapy in her care.\par \par She presents for a follow up and radiation therapy treatment planning. She completed DD ACT chemotherapy on 21. Right breast expander removal, implant insertion with left breast augmentation surgery performed on 12/15/2021. She is currently taking Letrozole.

## 2022-01-21 PROCEDURE — 77295 3-D RADIOTHERAPY PLAN: CPT

## 2022-01-21 PROCEDURE — 77334 RADIATION TREATMENT AID(S): CPT

## 2022-01-21 PROCEDURE — 77300 RADIATION THERAPY DOSE PLAN: CPT

## 2022-02-01 ENCOUNTER — NON-APPOINTMENT (OUTPATIENT)
Age: 62
End: 2022-02-01

## 2022-02-01 VITALS
RESPIRATION RATE: 19 BRPM | SYSTOLIC BLOOD PRESSURE: 142 MMHG | HEART RATE: 76 BPM | DIASTOLIC BLOOD PRESSURE: 86 MMHG | BODY MASS INDEX: 24.35 KG/M2 | OXYGEN SATURATION: 99 % | HEIGHT: 60 IN | WEIGHT: 124 LBS

## 2022-02-01 PROCEDURE — G6012: CPT

## 2022-02-01 NOTE — PHYSICAL EXAM
[Normal] : well developed, well nourished, in no acute distress [de-identified] : no breast erythema

## 2022-02-01 NOTE — DISEASE MANAGEMENT
[Pathological] : TNM Stage: p [IIB] : IIB [TTNM] : 2 [NTNM] : 1 [MTNM] : 0 [de-identified] : 305 [[place] [id number]] : 3416 [de-identified] : Right Chest Wall and Sclav

## 2022-02-01 NOTE — HISTORY OF PRESENT ILLNESS
[FreeTextEntry1] : The patient is a pleasant 60 year old female diagnosed with right breast cancer. She has family Hx of metastatic breast cancer in her 43 year old sister ( at age 29), tested negative for BRCA mutation 15 years ago. The patient had [facility] genetic panel testing which was negative (VUS in APC, BMPR1A, RAD51D). She was recovering from a mild case of COVID in 2021 and also noted right breast fullness (present about 1 year ago but did not bring it to any medical attention). She had not had any recent imaging. She was seen by her primary care doctor in 2021 and breast imaging was recommended due to an abnormal right breast and axilla exam. She was referred to Dr. Avila and had a confirmation of palpable right breast mass and axillary lymphadenopathy. Mammogram/ tomosynthesis revealed 2.1 cm enlarged right axillary node with cortical thickening and a 2.3 cm spiculated mass in the right breast at 6:00, 5cm FN. An MRI was recommended but not performed. Biopsy was performed on 3/24//21 of both breast and right axillary node and both were positive for Infiltrating ductal carcinoma, moderately differentiated, DCIS, solid and cribriform types, intermediate to high grade. ER %, NV negative, HER2 negative. Ki-67 35%. As part of preop workup CT chest, abdomen and pelvis was done revealing nonspecific subcentimeter pulmonary nodules and urinary bladder wall enhancing lesion.She was referred to Pulmonary/Urology (Dr. Hale) for clearance and there was no pathology confirmed She was also seen by gyn (Dr. Naik) who diagnosed her with fibroids rather than bladder wall thickening. She was started on Letrozole in anticipation of delay. After 2 months of Letrozole she noted a decrease in size of the lesion. Right mastectomy was performed with expander (Dr. Dang) on 21 and final pathology revealed  Invasive Ductal Carcinoma, tumor greatest dimension 3.3 x 2.1 x 1.5 cm. DCIS present. Margins uninvolved by IDC with closest distance 0.6 cm anterior. DCIS not found in margins, greater then 1cm. + LVI.  2/7 regional lymph nodes positive, both macrometastases, >2mm. The largest metastatic deposit 1.4 cm. Extranodal extension present. Biomarkers ER positive, NV negative, HER2 negative by FISH. Ki-67 35%. Oncotype DX is 27 and she is meeting with Dr. Crooks next week to review. She presents today to discuss the role of radiation therapy in her care.\par \par She presents for an OTV  fx. She completed DD ACT chemotherapy on 21. Right breast expander removal, implant insertion with left breast augmentation surgery performed on 12/15/2021. She is currently taking Letrozole. Scheduled to have her port removed this week.

## 2022-02-02 PROCEDURE — G6012: CPT

## 2022-02-03 PROCEDURE — G6012: CPT

## 2022-02-04 PROCEDURE — G6012: CPT

## 2022-02-07 PROCEDURE — 77427 RADIATION TX MANAGEMENT X5: CPT

## 2022-02-07 PROCEDURE — 77336 RADIATION PHYSICS CONSULT: CPT

## 2022-02-07 PROCEDURE — G6012: CPT

## 2022-02-08 PROCEDURE — G6012: CPT

## 2022-02-09 ENCOUNTER — NON-APPOINTMENT (OUTPATIENT)
Age: 62
End: 2022-02-09

## 2022-02-10 ENCOUNTER — NON-APPOINTMENT (OUTPATIENT)
Age: 62
End: 2022-02-10

## 2022-02-10 VITALS
OXYGEN SATURATION: 98 % | BODY MASS INDEX: 24.54 KG/M2 | HEIGHT: 60 IN | RESPIRATION RATE: 18 BRPM | SYSTOLIC BLOOD PRESSURE: 114 MMHG | HEART RATE: 87 BPM | DIASTOLIC BLOOD PRESSURE: 72 MMHG | WEIGHT: 125 LBS

## 2022-02-10 PROCEDURE — G6012: CPT

## 2022-02-10 PROCEDURE — 77417 THER RADIOLOGY PORT IMAGE(S): CPT

## 2022-02-10 NOTE — HISTORY OF PRESENT ILLNESS
[FreeTextEntry1] : The patient is a pleasant 61 year old female diagnosed with right breast cancer. She has family Hx of metastatic breast cancer in her 43 year old sister ( at age 29), tested negative for BRCA mutation 15 years ago. The patient had [facility] genetic panel testing which was negative (VUS in APC, BMPR1A, RAD51D). She was recovering from a mild case of COVID in 2021 and also noted right breast fullness (present about 1 year ago but did not bring it to any medical attention). She had not had any recent imaging. She was seen by her primary care doctor in 2021 and breast imaging was recommended due to an abnormal right breast and axilla exam. She was referred to Dr. Avila and had a confirmation of palpable right breast mass and axillary lymphadenopathy. Mammogram/ tomosynthesis revealed 2.1 cm enlarged right axillary node with cortical thickening and a 2.3 cm spiculated mass in the right breast at 6:00, 5cm FN. An MRI was recommended but not performed. Biopsy was performed on 3/24//21 of both breast and right axillary node and both were positive for Infiltrating ductal carcinoma, moderately differentiated, DCIS, solid and cribriform types, intermediate to high grade. ER %, WA negative, HER2 negative. Ki-67 35%. As part of preop workup CT chest, abdomen and pelvis was done revealing nonspecific subcentimeter pulmonary nodules and urinary bladder wall enhancing lesion.She was referred to Pulmonary/Urology (Dr. Hale) for clearance and there was no pathology confirmed She was also seen by gyn (Dr. Naik) who diagnosed her with fibroids rather than bladder wall thickening. She was started on Letrozole in anticipation of delay. After 2 months of Letrozole she noted a decrease in size of the lesion. Right mastectomy was performed with expander (Dr. Dang) on 21 and final pathology revealed  Invasive Ductal Carcinoma, tumor greatest dimension 3.3 x 2.1 x 1.5 cm. DCIS present. Margins uninvolved by IDC with closest distance 0.6 cm anterior. DCIS not found in margins, greater then 1cm. + LVI.  2/7 regional lymph nodes positive, both macrometastases, >2mm. The largest metastatic deposit 1.4 cm. Extranodal extension present. Biomarkers ER positive, WA negative, HER2 negative by FISH. Ki-67 35%. Oncotype DX is 27 and she is meeting with Dr. Crooks next week to review. She presents today to discuss the role of radiation therapy in her care.\par \par She presents for an OTV  fx. She completed DD ACT chemotherapy on 21.. She is currently taking Letrozole. Medi port removed.Feels well and using aloe and aquaphor on skin.

## 2022-02-10 NOTE — DISEASE MANAGEMENT
[TTNM] : 2 [NTNM] : 1 [MTNM] : 0 [de-identified] : [id number] [[place] [address]] : [id number] [de-identified] : Right Chest Wall and Sclav

## 2022-02-11 PROCEDURE — G6012: CPT

## 2022-02-14 ENCOUNTER — NON-APPOINTMENT (OUTPATIENT)
Age: 62
End: 2022-02-14

## 2022-02-14 VITALS
SYSTOLIC BLOOD PRESSURE: 125 MMHG | HEIGHT: 60 IN | BODY MASS INDEX: 24.54 KG/M2 | HEART RATE: 87 BPM | OXYGEN SATURATION: 97 % | WEIGHT: 125 LBS | DIASTOLIC BLOOD PRESSURE: 76 MMHG | RESPIRATION RATE: 18 BRPM

## 2022-02-14 PROCEDURE — G6012: CPT

## 2022-02-15 ENCOUNTER — NON-APPOINTMENT (OUTPATIENT)
Age: 62
End: 2022-02-15

## 2022-02-15 PROCEDURE — 77336 RADIATION PHYSICS CONSULT: CPT

## 2022-02-15 PROCEDURE — 77427 RADIATION TX MANAGEMENT X5: CPT

## 2022-02-15 PROCEDURE — G6012: CPT

## 2022-02-16 PROCEDURE — G6012: CPT

## 2022-02-17 PROCEDURE — G6012: CPT

## 2022-02-17 PROCEDURE — 77417 THER RADIOLOGY PORT IMAGE(S): CPT

## 2022-02-18 PROCEDURE — G6012: CPT

## 2022-02-22 PROCEDURE — G6012: CPT

## 2022-02-23 ENCOUNTER — NON-APPOINTMENT (OUTPATIENT)
Age: 62
End: 2022-02-23

## 2022-02-23 VITALS — RESPIRATION RATE: 16 BRPM | BODY MASS INDEX: 24.54 KG/M2 | WEIGHT: 125 LBS | HEIGHT: 60 IN | HEART RATE: 84 BPM

## 2022-02-23 PROCEDURE — G6012: CPT

## 2022-02-23 PROCEDURE — 77427 RADIATION TX MANAGEMENT X5: CPT

## 2022-02-23 NOTE — DISEASE MANAGEMENT
[Pathological] : TNM Stage: p [IIB] : IIB [TTNM] : 2 [NTNM] : 1 [MTNM] : 0 [de-identified] : [id number] [[place] [id number]] : [id number] [de-identified] : Right Chest Wall and Sclav

## 2022-02-23 NOTE — HISTORY OF PRESENT ILLNESS
[FreeTextEntry1] : The patient is a pleasant 61 year old female diagnosed with right breast cancer. She has family Hx of metastatic breast cancer in her 43 year old sister ( at age 29), tested negative for BRCA mutation 15 years ago. The patient had [facility] genetic panel testing which was negative (VUS in APC, BMPR1A, RAD51D). She was recovering from a mild case of COVID in 2021 and also noted right breast fullness (present about 1 year ago but did not bring it to any medical attention). She had not had any recent imaging. She was seen by her primary care doctor in 2021 and breast imaging was recommended due to an abnormal right breast and axilla exam. She was referred to Dr. Avila and had a confirmation of palpable right breast mass and axillary lymphadenopathy. Mammogram/ tomosynthesis revealed 2.1 cm enlarged right axillary node with cortical thickening and a 2.3 cm spiculated mass in the right breast at 6:00, 5cm FN. An MRI was recommended but not performed. Biopsy was performed on 3/24//21 of both breast and right axillary node and both were positive for Infiltrating ductal carcinoma, moderately differentiated, DCIS, solid and cribriform types, intermediate to high grade. ER %, WA negative, HER2 negative. Ki-67 35%. As part of preop workup CT chest, abdomen and pelvis was done revealing nonspecific subcentimeter pulmonary nodules and urinary bladder wall enhancing lesion.She was referred to Pulmonary/Urology (Dr. Hale) for clearance and there was no pathology confirmed She was also seen by gyn (Dr. Naik) who diagnosed her with fibroids rather than bladder wall thickening. She was started on Letrozole in anticipation of delay. After 2 months of Letrozole she noted a decrease in size of the lesion. Right mastectomy was performed with expander (Dr. Dang) on 21 and final pathology revealed  Invasive Ductal Carcinoma, tumor greatest dimension 3.3 x 2.1 x 1.5 cm. DCIS present. Margins uninvolved by IDC with closest distance 0.6 cm anterior. DCIS not found in margins, greater then 1cm. + LVI.  2/7 regional lymph nodes positive, both macrometastases, >2mm. The largest metastatic deposit 1.4 cm. Extranodal extension present. Biomarkers ER positive, WA negative, HER2 negative by FISH. Ki-67 35%. Oncotype DX is 27 and she is meeting with Dr. Crooks next week to review. She presents today to discuss the role of radiation therapy in her care.\par \par She presents for an OTV 15/16 fx.  She is currently taking Letrozole. Feels well and using aloe and Aquaphor for her skin. Pruritus and skin rash on right scapula region noted.

## 2022-02-23 NOTE — PHYSICAL EXAM
[Normal] : well developed, well nourished, in no acute distress [No UE Edema] : there is no upper extremity edema [de-identified] : G0 R  breast erythema

## 2022-02-25 PROCEDURE — G6012: CPT

## 2022-02-27 NOTE — DISEASE MANAGEMENT
[TTNM] : 2 [NTNM] : 1 [MTNM] : 0 [de-identified] : [id number] [de-identified] : Right Chest Wall and Sclav [[place] [id number]] : [id number]

## 2022-02-27 NOTE — HISTORY OF PRESENT ILLNESS
[FreeTextEntry1] : The patient is a pleasant 61 year old female diagnosed with right breast cancer. She has family Hx of metastatic breast cancer in her 43 year old sister ( at age 29), tested negative for BRCA mutation 15 years ago. The patient had [facility] genetic panel testing which was negative (VUS in APC, BMPR1A, RAD51D). She was recovering from a mild case of COVID in 2021 and also noted right breast fullness (present about 1 year ago but did not bring it to any medical attention). She had not had any recent imaging. She was seen by her primary care doctor in 2021 and breast imaging was recommended due to an abnormal right breast and axilla exam. She was referred to Dr. Avila and had a confirmation of palpable right breast mass and axillary lymphadenopathy. Mammogram/ tomosynthesis revealed 2.1 cm enlarged right axillary node with cortical thickening and a 2.3 cm spiculated mass in the right breast at 6:00, 5cm FN. An MRI was recommended but not performed. Biopsy was performed on 3/24//21 of both breast and right axillary node and both were positive for Infiltrating ductal carcinoma, moderately differentiated, DCIS, solid and cribriform types, intermediate to high grade. ER %, WA negative, HER2 negative. Ki-67 35%. As part of preop workup CT chest, abdomen and pelvis was done revealing nonspecific subcentimeter pulmonary nodules and urinary bladder wall enhancing lesion.She was referred to Pulmonary/Urology (Dr. Hale) for clearance and there was no pathology confirmed She was also seen by gyn (Dr. Naik) who diagnosed her with fibroids rather than bladder wall thickening. She was started on Letrozole in anticipation of delay. After 2 months of Letrozole she noted a decrease in size of the lesion. Right mastectomy was performed with expander (Dr. Dang) on 21 and final pathology revealed  Invasive Ductal Carcinoma, tumor greatest dimension 3.3 x 2.1 x 1.5 cm. DCIS present. Margins uninvolved by IDC with closest distance 0.6 cm anterior. DCIS not found in margins, greater then 1cm. + LVI.  2/7 regional lymph nodes positive, both macrometastases, >2mm. The largest metastatic deposit 1.4 cm. Extranodal extension present. Biomarkers ER positive, WA negative, HER2 negative by FISH. Ki-67 35%. Oncotype DX is 27 and she is meeting with Dr. Crooks next week to review. She presents today to discuss the role of radiation therapy in her care.\par \par She presents for an OTV 10/16 fx. She completed DD ACT chemotherapy on 21.. She is currently taking Letrozole. Medi port removed.Feels well and using aloe and aquaphor on skin.

## 2022-03-31 ENCOUNTER — APPOINTMENT (OUTPATIENT)
Dept: RADIATION ONCOLOGY | Facility: CLINIC | Age: 62
End: 2022-03-31
Payer: COMMERCIAL

## 2022-03-31 ENCOUNTER — NON-APPOINTMENT (OUTPATIENT)
Age: 62
End: 2022-03-31

## 2022-03-31 VITALS
RESPIRATION RATE: 21 BRPM | WEIGHT: 126 LBS | DIASTOLIC BLOOD PRESSURE: 73 MMHG | BODY MASS INDEX: 24.74 KG/M2 | HEART RATE: 94 BPM | OXYGEN SATURATION: 99 % | SYSTOLIC BLOOD PRESSURE: 119 MMHG | HEIGHT: 60 IN

## 2022-03-31 PROCEDURE — 99024 POSTOP FOLLOW-UP VISIT: CPT

## 2022-03-31 NOTE — HISTORY OF PRESENT ILLNESS
[FreeTextEntry1] : The patient is a pleasant 61 year old female diagnosed with right breast cancer. She has family Hx of metastatic breast cancer in her 43 year old sister (dx at age 29), tested negative for BRCA mutation 15 years ago. The patient had [facility] genetic panel testing which was negative (VUS in APC, BMPR1A, RAD51D). She was recovering from a mild case of COVID in February 2021 and also noted right breast fullness (present about 1 year ago but did not bring it to any medical attention). She had not had any recent imaging. She was seen by her primary care doctor in March 2021 and breast imaging was recommended due to an abnormal right breast and axilla exam. She was referred to Dr. Avila and had a confirmation of palpable right breast mass and axillary lymphadenopathy. Mammogram/ tomosynthesis revealed 2.1 cm enlarged right axillary node with cortical thickening and a 2.3 cm spiculated mass in the right breast at 6:00, 5cm FN. An MRI was recommended but not performed. Biopsy was performed on 3/24//21 of both breast and right axillary node and both were positive for Infiltrating ductal carcinoma, moderately differentiated, DCIS, solid and cribriform types, intermediate to high grade. ER %, OK negative, HER2 negative. Ki-67 35%. As part of preop workup CT chest, abdomen and pelvis was done revealing nonspecific subcentimeter pulmonary nodules and urinary bladder wall enhancing lesion.She was referred to Pulmonary/Urology (Dr. Hale) for clearance and there was no pathology confirmed She was also seen by gyn (Dr. Naik) who diagnosed her with fibroids rather than bladder wall thickening. She was started on Letrozole in anticipation of delay. After 2 months of Letrozole she noted a decrease in size of the lesion. \par Right mastectomy was performed with expander (Dr. Dang) on 6/21/21 and final pathology revealed  Invasive Ductal Carcinoma, tumor greatest dimension 3.3 x 2.1 x 1.5 cm. DCIS present. Margins uninvolved by IDC with closest distance 0.6 cm anterior. DCIS not found in margins, greater then 1cm. + LVI.  2/7 regional lymph nodes positive, both macrometastases, >2mm. The largest metastatic deposit 1.4 cm. Extranodal extension present. Biomarkers ER positive, OK negative, HER2 negative by FISH. Ki-67 35%. Oncotype DX was 27 and she received adjuvsnt chemotherapy. She then underwent implant exchange and completed a dose of 4240 cGy to the right chest wall and sclav over 3 weeks on 2/25/22.\par \par She presents for a one month PTE. She is currently taking Letrozole and Prolia. She reports achiness in her bones in whole body, especially in AM.  Pruritus and skin rash on right scapula region completely resolved. Sleeping well..She recently moved to a new house and does a lot of work with the house. Reports cording of R arm and asymmetry of breasts.

## 2022-06-21 ENCOUNTER — TRANSCRIPTION ENCOUNTER (OUTPATIENT)
Age: 62
End: 2022-06-21

## 2022-07-11 ENCOUNTER — NON-APPOINTMENT (OUTPATIENT)
Age: 62
End: 2022-07-11

## 2022-11-02 ENCOUNTER — APPOINTMENT (OUTPATIENT)
Dept: RADIATION ONCOLOGY | Facility: CLINIC | Age: 62
End: 2022-11-02

## 2022-11-02 ENCOUNTER — NON-APPOINTMENT (OUTPATIENT)
Age: 62
End: 2022-11-02

## 2022-11-02 VITALS
RESPIRATION RATE: 18 BRPM | BODY MASS INDEX: 25.52 KG/M2 | DIASTOLIC BLOOD PRESSURE: 72 MMHG | OXYGEN SATURATION: 99 % | HEART RATE: 71 BPM | HEIGHT: 60 IN | SYSTOLIC BLOOD PRESSURE: 110 MMHG | WEIGHT: 130 LBS

## 2022-11-02 PROCEDURE — 99213 OFFICE O/P EST LOW 20 MIN: CPT

## 2022-11-02 NOTE — HISTORY OF PRESENT ILLNESS
[FreeTextEntry1] : The patient is a pleasant 62 year old female diagnosed with right breast cancer referred by Vivien Avila and Daksha. \par She has family Hx of metastatic breast cancer in her 43 year old sister (dx at age 29), tested negative for BRCA mutation 15 years ago. The [facility] which was negative (VUS in APC, BMPR1A, RAD51D). She was recovering from a mild case of COVID in February 2021 and also noted right breast fullness (present about 1 year ago but did not bring it to any medical attention). She had not had any recent imaging. She was seen by her primary care doctor in March 2021 and breast imaging was recommended due to an abnormal right breast and axilla exam. She was referred to Dr. Avila and had a confirmation of palpable right breast mass and axillary lymphadenopathy. Mammogram/ tomosynthesis revealed 2.1 cm enlarged right axillary node with cortical thickening and a 2.3 cm spiculated mass in the right breast at 6:00, 5cm FN. An MRI was recommended but not performed. Biopsy was performed on 3/24//21 of both breast and right axillary node and both were positive for Infiltrating ductal carcinoma, moderately differentiated, DCIS, solid and cribriform types, intermediate to high grade. ER %, RI negative, HER2 negative. Ki-67 35%. As part of preop workup CT chest, abdomen and pelvis was done revealing nonspecific subcentimeter pulmonary nodules and urinary bladder wall enhancing lesion.She was referred to Pulmonary/Urology (Dr. Hale) for clearance and there was no pathology confirmed She was also seen by gyn (Dr. Naik) who diagnosed her with fibroids rather than bladder wall thickening. She was started on Letrozole in anticipation of delay. After 2 months of Letrozole she noted a decrease in size of the lesion. \par Right mastectomy was performed with expander (Dr. Dang) on 6/21/21 and final pathology revealed  Invasive Ductal Carcinoma, tumor greatest dimension 3.3 x 2.1 x 1.5 cm. DCIS present. Margins uninvolved by IDC with closest distance 0.6 cm anterior. DCIS not found in margins, greater then 1cm. + LVI.  2/7 regional lymph nodes positive, both macrometastases, >2mm. The largest metastatic deposit 1.4 cm. Extranodal extension present. Biomarkers ER positive, RI negative, HER2 negative by FISH. Ki-67 35%. Oncotype DX was 27 and she received adjuvsnt chemotherapy. She then underwent implant exchange and completed a dose of 4240 cGy to the right chest wall and sclav over 3 weeks on 2/25/22.\par \par She presents for a 8 month follow up. She is currently taking Letrozole and Prolia. She tried Verzinio but had intractable nausea and discontinued it  yesterday. She reports achiness in her bones in whole body, especially in AM. She is unhappy with her assymmetry of her breasts. She reports tightness of R implant but no pain.

## 2022-11-02 NOTE — PHYSICAL EXAM
[No UE Edema] : there is no upper extremity edema [Normal] : oriented to person, place and time, the affect was normal, the mood was normal and not anxious [de-identified] : R high impalnt no nodules or erythema. L breast healed scars no masses

## 2023-02-11 NOTE — H&P PST ADULT - IS PATIENT PREGNANT?
Prep Survey    Flowsheet Row Responses   [facility] facility patient discharged from? [facility]   Is LACE score < 7 ? No   Eligibility Readm Mgmt   Discharge diagnosis Subdural hemorrhage    Does the patient have one of the following disease processes/diagnoses(primary or secondary)? Stroke   Does the patient have Home health ordered? No   Is there a DME ordered? No   Prep survey completed? Yes          YOANA WEBB - Registered Nurse        
no

## 2023-03-31 NOTE — PHYSICAL EXAM
[Normal] : well developed, well nourished, in no acute distress [No UE Edema] : there is no upper extremity edema [de-identified] : The right breast higher and well-healed intact with soft implant. Left breast pendulous no masses. 15

## 2023-09-22 ENCOUNTER — APPOINTMENT (OUTPATIENT)
Dept: RADIATION ONCOLOGY | Facility: CLINIC | Age: 63
End: 2023-09-22

## 2023-11-08 ENCOUNTER — APPOINTMENT (OUTPATIENT)
Dept: RADIATION ONCOLOGY | Facility: CLINIC | Age: 63
End: 2023-11-08
Payer: COMMERCIAL

## 2023-11-08 VITALS
WEIGHT: 135 LBS | RESPIRATION RATE: 18 BRPM | OXYGEN SATURATION: 98 % | HEIGHT: 60 IN | SYSTOLIC BLOOD PRESSURE: 120 MMHG | BODY MASS INDEX: 26.5 KG/M2 | DIASTOLIC BLOOD PRESSURE: 72 MMHG | HEART RATE: 52 BPM

## 2023-11-08 DIAGNOSIS — C50.511 MALIGNANT NEOPLASM OF LOWER-OUTER QUADRANT OF RIGHT FEMALE BREAST: ICD-10-CM

## 2023-11-08 PROCEDURE — 99213 OFFICE O/P EST LOW 20 MIN: CPT

## 2023-11-08 RX ORDER — ASPIRIN 81 MG
81 TABLET, DELAYED RELEASE (ENTERIC COATED) ORAL
Refills: 0 | Status: ACTIVE | COMMUNITY

## 2024-09-11 NOTE — H&P PST ADULT - NSANTHOBSERVEDRD_ENT_A_CORE
C3 nurse spoke with Arturo Salguero III for a TCC post hospital discharge follow up call. The patient does not have a scheduled HOSFU appointment with Glen Andrade MD within 5-7 days post hospital discharge date 09/07/24. C3 nurse was unable to schedule HOSFU appointment in [facility].    Message sent to PCP staff requesting they contact patient and schedule follow up appointment.         No

## 2025-04-03 NOTE — BRIEF OPERATIVE NOTE - ESTIMATED BLOOD LOSS
100